# Patient Record
Sex: FEMALE | Race: OTHER
[De-identification: names, ages, dates, MRNs, and addresses within clinical notes are randomized per-mention and may not be internally consistent; named-entity substitution may affect disease eponyms.]

---

## 2018-07-31 ENCOUNTER — HOSPITAL ENCOUNTER (EMERGENCY)
Dept: HOSPITAL 61 - ER | Age: 38
Discharge: HOME | End: 2018-07-31
Payer: COMMERCIAL

## 2018-07-31 DIAGNOSIS — Z90.49: ICD-10-CM

## 2018-07-31 DIAGNOSIS — Z98.890: ICD-10-CM

## 2018-07-31 DIAGNOSIS — H66.91: Primary | ICD-10-CM

## 2018-07-31 DIAGNOSIS — K21.9: ICD-10-CM

## 2018-07-31 DIAGNOSIS — Z90.89: ICD-10-CM

## 2018-07-31 DIAGNOSIS — H93.11: ICD-10-CM

## 2018-07-31 PROCEDURE — 99283 EMERGENCY DEPT VISIT LOW MDM: CPT

## 2018-08-27 ENCOUNTER — HOSPITAL ENCOUNTER (EMERGENCY)
Dept: HOSPITAL 61 - ER | Age: 38
Discharge: HOME | End: 2018-08-27
Payer: COMMERCIAL

## 2018-08-27 VITALS — HEIGHT: 57 IN | BODY MASS INDEX: 44.44 KG/M2 | WEIGHT: 206 LBS

## 2018-08-27 VITALS — SYSTOLIC BLOOD PRESSURE: 103 MMHG | DIASTOLIC BLOOD PRESSURE: 59 MMHG

## 2018-08-27 DIAGNOSIS — K21.9: ICD-10-CM

## 2018-08-27 DIAGNOSIS — Z90.49: ICD-10-CM
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
## 2018-08-27 DIAGNOSIS — S16.1XXA: Primary | ICD-10-CM
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
## 2018-08-27 DIAGNOSIS — M54.12: ICD-10-CM
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
## 2018-08-27 PROCEDURE — 99284 EMERGENCY DEPT VISIT MOD MDM: CPT

## 2018-08-27 NOTE — PHYS DOC
Past Medical History


Past Medical History:  No Pertinent History, GERD


Past Surgical History:  Appendectomy, Cholecystectomy, , Other


Additional Past Surgical Histo:  Foot surgery


Alcohol Use:  None


Drug Use:  None





Adult General


Chief Complaint


Chief Complaint:  Neck Pain





HPI


HPI





Patient is a 38  year old female who presents to emergency room with complaints 

of right neck pain that radiates into her shoulder and down her right arm for 

the last 4 days. She denies any known injury, weakness, or decreased range of 

motion. Currently she reports her HEENT is 8 out of 10 on the pain scale, she 

is taking ibuprofen for relief of her pain with little to no help. She reports 

her last menstrual period started approximately 2 days ago. She states that the 

pain shoots a tingling sensation down her right arm. Patient states she is not 

allergic to any medications she has no medical history, and her surgical 

history includes surgery on her left foot, right wrist, 2 C-sections, 

appendectomy, and cholecystectomy. He denies any numbness or weakness of her 

right upper extremity.





Review of Systems


Review of Systems





Constitutional: Denies fever or chills []


Musculoskeletal: Denies back pain, reports pain in R side of neck to R shoulder 

and shooting pain down right arm. 


Integument: Denies rash or skin lesions []


Neurologic: Denies headache, or focal weakness; reports tingling shooting pain 

down right arm








All other systems were reviewed and found to be within normal limits, except as 

documented in this note.





Current Medications


Current Medications





Current Medications








 Medications


  (Trade)  Dose


 Ordered  Sig/[facility]  Start Time


 Stop Time Status Last Admin


Dose Admin


 


 Ketorolac


 Tromethamine


  (Toradol 30mg


 Vial)  30 mg  1X  ONCE  18 13:30


 18 13:31 DC  





 


 Ketorolac


 Tromethamine


  (Toradol Im)  30 mg  1X  ONCE  18 13:30


 18 13:31 DC 18 13:27


30 MG


 


 Orphenadrine


 Citrate


  (Norflex)  60 mg  1X  ONCE  18 13:30


 18 13:31 DC 18 13:24


60 MG











Allergies


Allergies





Allergies








Coded Allergies Type Severity Reaction Last Updated Verified


 


  No Known Drug Allergies    18 No











Physical Exam


Physical Exam





Constitutional: Well developed, well nourished, no acute distress, non-toxic 

appearance. []


HENT: Normocephalic, atraumatic, bilateral external ears normal,  nose normal. [

]


Eyes: PERRLA, conjunctiva normal, no discharge. [] 


Neck: Normal range of motion, no body tenderness, supple, no stridor, chin to 

chest normal . [] 


Lungs & Thorax:  Respirations even and unlabored 


Skin: Warm, dry, no erythema, no rash. [] 


Extremities: No tenderness, no cyanosis, no clubbing, ROM intact, no edema, 

equal  bilat. [] 


Neurologic: Alert and oriented X 3, normal motor function, normal sensory 

function, no focal deficits noted. []


Psychologic: Affect normal, judgement normal, mood normal. []





Current Patient Data


Vital Signs





 Vital Signs








  Date Time  Temp Pulse Resp B/P (MAP) Pulse Ox O2 Delivery O2 Flow Rate FiO2


 


18 12:30 98.2 82 18 103/59 (74) 99 Room Air  





 98.2       











EKG


EKG


[]





Radiology/Procedures


Radiology/Procedures


[]





Course & Med Decision Making


Course & Med Decision Making


Pertinent Labs and Imaging studies reviewed. (See chart for details)


Patient is a 38-year-old female who presented to the emergency room with 

complaints of right-sided neck pain issues and her right shoulder and since 

tingling sensation down her right arm for the last 3 days.


Her vital signs are stable, she was given an IM injection of 30 mg of Toradol, 

and 60 mg of orphenadrine, patient reported relief of her symptoms after these 

medications. Her physical exam and patient history are consistent with a 

cervical neck strain with paresthesias and radiation to the right shoulder, 

treated as such. Patient will be given a prescription for orphenadrine and 

naproxen to take at home.Patient verbalized an understanding of home care, 

medications, follow-up, and return to ED instructions and was in agreement with 

the plan of care.


[]





Dragon Disclaimer


Dragon Disclaimer


This electronic medical record was generated, in whole or in part, using a 

voice recognition dictation system.





Departure


Departure


Impression:  


 Primary Impression:  


 Right cervical radiculopathy


 Additional Impressions:  


 Strain of cervical portion of right trapezius muscle


 Cervical strain, acute


Disposition:  01 HOME, SELF-CARE


Condition:  IMPROVED


Referrals:  


NO PCP (PCP)


Patient Instructions:  Cervical Sprain, Easy-to-Read





Additional Instructions:  


Fill the Prescriptions and use them as directed, recommended application of 

heat or ice to sore areas as needed for comfort. Follow-up with your primary 

care doctor in 1-2 days. Return to the emergency room if her symptoms worsen.


Scripts


Orphenadrine Citrate (ORPHENADRINE CITRATE) 100 Mg Tablet.er


100 MG PO BID PRN for PAIN for 7 Days, #14 TAB.SR 0 Refills


   Prov: AWILDA MAIER APRN         18 


Naproxen (NAPROXEN) 500 Mg Tablet


500 MG PO BID for 10 Days, #20 TAB 0 Refills


   Prov: AWILDA MAIER APRN         18





Problem Qualifiers








 Additional Impressions:  


 Cervical strain, acute


 Encounter type:  initial encounter  Qualified Codes:  S16.1XXA - Strain of 

muscle, fascia and tendon at neck level, initial encounter








AWILDA MAIER APRN Aug 27, 2018 13:57